# Patient Record
(demographics unavailable — no encounter records)

---

## 2024-12-04 NOTE — ASSESSMENT
[FreeTextEntry1] : Mr. MELENDEZ is a 52 old male with a history of OW, HTN, elevated cholesterol, pre-DM, asthma, COVID-19 3/2022 allergies, low vitamin D, prior MI, lipoprotein A deficiency- asthma, allergies, low testosterone, poor sleep - stable- except fatigue (#1)  Mr. Melendez's SOB is multifactorial due to: -poor mechanics of breathing -out of shape - over weight - pulmonary disease  - mild asthma -cardiac disease / CAD prior MI  problem 1: OSAS, EDS (NC) - s/p home sleep study - repeat HSS -based on his fatigue; EDS, snoring, and elevated mallampati class -recommended "snore tape" -recommended CoQ10 200mg QAM  -Sleep apnea is associated with adverse clinical consequences which an affect most organ systems. Cardiovascular disease risk includes arrhythmias, atrial fibrillation, hypertension, coronary artery disease, and stroke. Metabolic disorders include diabetes type 2, non-alcoholic fatty liver disease. Mood disorder especially depression; and cognitive decline especially in the elderly. Associations with chronic reflux/Magana's esophagus some but not all inclusive. -Reasons to assess this include arousal consistent with hypopnea; respiratory events most prominent in REM sleep or supine position; therefore sleep staging and body position are important for accurate diagnosis and estimation of AHI.  problem 2: mild asthma (Quiet) -continue Ventolin 2 puffs Q6H, pre-exercise -Asthma is believed to be caused by inherited (genetic) and environmental factor, but its exact cause is unknown. Asthma may be triggered by allergens, lung infections, or irritants in the air. Asthma triggers are different for each person -Inhaler technique reviewed as well as oral hygiene techniques reviewed with patient. Avoidance of cold air, extremes of temperature, rescue inhaler should be used before exercise. Order of medication reviewed with patient. Recommended use of a cool mist humidifier in the bedroom.  Problem 3: Cardiac (CAD prior MI) - follow up with   Problem 3A: Fatty liver and RUQ pain -recommended to have evaluation with Dr. Familia Smith  problem 4: low testosterone/high triglycerides -he is being recommended to have an endocrinologist evaluation and he was given the name of Dr. Adolph Brink - recommended [place] 6mg BID - repeat HSS  problem 4: obesity (improved) -recommended berberine OTC for weight loss -Weight loss, exercise, and diet control were discussed and are highly encouraged. Treatment options were given such as, aqua therapy, and contacting a nutritionist. Recommended to use the elliptical, stationary bike, less use of treadmill. Obesity is associated with worsening asthma, shortness of breath, and potential for cardiac disease, diabetes, and other underlying medical conditions.  problem 5: low vitamin D -continue to use supplemental vitamin d at 50,000 units every 2 weeks -Has been associated with asthma exacerbations and increased allergic symptoms. The goal based on recent information is maintaining levels between 50-70 and low normal is 30. Recommended 50,000 units every two weeks to once a month depending on the level.  problem 6: allergies- quiet -continue to use nasal saline PRN -Environmental measures for allergies were encouraged including mattress and pillow cover, air purifier, and environmental controls.  Problem 7: Health Maintenance/COVID19 Precautions: -s/p COVID-19 vaccine x2 - discussed w/ pt about booster -Recommended [employer] -recommended Sanotize anti viral nasal spray in case of viral infection Immune Support Recommendations: -OTC Vitamin C [id number] BID -OTC Quercetin 1000mg BID -OTC Zinc 50-100mg per day -OTC Melatonin 1-3mg a night -OTC Vitamin D 2000mg per day  Problem 8: health maintenance -recommended yearly flu shot 2024 -recommended strep pneumonia vaccines: Prevnar-20 vaccine, followed by Pneumo vaccine 23 one year following after 65 years old. -recommended early intervention for Upper Respiratory Infections (URIs) -recommended regular osteoporosis evaluations -recommended early dermatological evaluations -recommended after the age of 50 to the age of 70, colonoscopy every 5 years   F/P in 3 months He is encouraged to call with any changes, concerns, or questions.

## 2024-12-04 NOTE — REASON FOR VISIT
[Follow-Up] : a follow-up visit [TextBox_44] : allergies, asthma, low vitamin d, EDS / fatigue (Inadequate Sleep)

## 2024-12-04 NOTE — HISTORY OF PRESENT ILLNESS
[FreeTextEntry1] : Mr. Ruvalcaba is a 52 year old male presenting to the office today for a follow-up pulmonary evaluation for allergies, asthma, low vitamin d. His chief complaint is  -he notes feeling generally well  -he notes exercising (walking) -he notes night sweats -he notes bowels are regular  -he notes his senses of smell and taste are stable  -he notes vision is stable  -he notes sleep quality could be improved, but is still improved from prior -he notes sleeping for 6-7 hours, better than before -he notes he wears cotton at night -he denies significant back pain -he denies any muscle cramps or spasms  -he denies bringing up mucus at this time -he notes he had bronchitis in the summer -he notes snoring  -he notes work is keeping him busy -he notes his #1 issue is fatigue -he denies taking supplements beyond a multivitamin  -he denies any headaches, nausea, emesis, fever, chills, chest pain, chest pressure, coughing, wheezing, palpitations, diarrhea, constipation, dysphagia, vertigo, arthralgias, myalgias, leg swelling, itchy eyes, itchy ears, heartburn, reflux, or sour taste in the mouth.

## 2024-12-04 NOTE — ADDENDUM
[FreeTextEntry1] : Documented by Key Lipscomb acting as a scribe for Dr. Dario Nolasco on 12/04/2024. All medical record entries made by the Scribe were at my, Dr. Dario Nolasco's, direction and personally dictated by me on 12/04/2024. I have reviewed the chart and agree that the record accurately reflects my personal performance of the history, physical exam, assessment and plan. I have also personally directed, reviewed, and agree with the discharge instructions.

## 2024-12-04 NOTE — PROCEDURE
[FreeTextEntry1] : PFTs revealed normal flows; FEV1 was 3.44L, which is 88% of predicted; normal flow volume loop. PFTs were performed to evaluate for asthma  FENO was 23; a normal value being less than 25 Fractional exhaled nitric oxide (FENO) is regarded as a simple, noninvasive method for assessing eosinophilic airway inflammation. Produced by a variety of cells within the lung, nitric oxide (NO) concentrations are generally low in healthy individuals. However, high concentrations of NO appear to be involved in nonspecific host defense mechanisms and chronic inflammatory diseases such as asthma. The American Thoracic Society (ATS) therefore has recommended using FENO to aid in the diagnosis and monitoring of eosinophilic airway inflammation and asthma, and for identifying steroid responsive individuals whose chronic respiratory symptoms may be caused by airway inflammation.

## 2025-06-11 NOTE — ADDENDUM
[FreeTextEntry1] : Documented by Key Lipscomb acting as a scribe for Dr. Dario Nolasco on 06/11/2025. All medical record entries made by the Scribe were at my, Dr. Dario Nolasco's, direction and personally dictated by me on 06/11/2025. I have reviewed the chart and agree that the record accurately reflects my personal performance of the history, physical exam, assessment and plan. I have also personally directed, reviewed, and agree with the discharge instructions.

## 2025-06-11 NOTE — PHYSICAL EXAM
[No Acute Distress] : no acute distress [Normal Oropharynx] : normal oropharynx [III] : Mallampati Class: III [Normal Appearance] : normal appearance [No Neck Mass] : no neck mass [Normal Rate/Rhythm] : normal rate/rhythm [Normal S1, S2] : normal s1, s2 [No Murmurs] : no murmurs [No Resp Distress] : no resp distress [Clear to Auscultation Bilaterally] : clear to auscultation bilaterally [Benign] : benign [Normal Gait] : normal gait [No Clubbing] : no clubbing [No Cyanosis] : no cyanosis [No Edema] : no edema [FROM] : FROM [Normal Color/ Pigmentation] : normal color/ pigmentation [No Focal Deficits] : no focal deficits [Oriented x3] : oriented x3 [Normal Affect] : normal affect [TextBox_68] : I:E 1:3; Clear  [TextBox_2] : ow [TextBox_80] : mild kyphosis

## 2025-06-11 NOTE — REASON FOR VISIT
[Follow-Up] : a follow-up visit [Other: _____] : [unfilled] [TextBox_44] : allergies, asthma, low vitamin d, EDS / fatigue (Inadequate Sleep)

## 2025-06-11 NOTE — HISTORY OF PRESENT ILLNESS
[FreeTextEntry1] : Mr. Ruvalcaba is a 53-year-old male presenting to the office today for a follow-up pulmonary evaluation with a history of allergies, asthma, low vitamin d.   -he notes feeling generally well -he denies exercising due to coming home very late from work the last 3 months -he notes he'll be working at [employer] soon -he notes energy levels are 6-7/10 -he notes gaining weight (15 lbs) -he denies chest pain and chest pressure -he notes bowels are regular -he denies dysphagia/sour taste in the mouth -he notes sleep quality and quantity vary -he denies any muscle cramps or spasms  -he notes he tripped and fell at work 1 week ago. He feels fully recovered -he denies any headaches -he denies sinus congestion, rhinitis, or PNDrip  -he denies taking any new medications, vitamins, or supplements -he notes he's on repatha, vitamin D, amlodipine, irbesartan -he notes Dr. Campo told him to stop taking CoQ10 because he doesn't need it

## 2025-06-11 NOTE — PROCEDURE
[FreeTextEntry1] : Full PFT reveals normal flows; FEV1 was 3.30L which is 98% of predicted; normal lung volumes; normal diffusion at 24.71, which is 101% of predicted; normal flow volume loop. PFTs were performed to evaluate for asthma  FENO was 24; a normal value being less than 25 Fractional exhaled nitric oxide (FENO) is regarded as a simple, noninvasive method for assessing eosinophilic airway inflammation. Produced by a variety of cells within the lung, nitric oxide (NO) concentrations are generally low in healthy individuals. However, high concentrations of NO appear to be involved in nonspecific host defense mechanisms and chronic inflammatory diseases such as asthma. The American Thoracic Society (ATS) therefore has recommended using FENO to aid in the diagnosis and monitoring of eosinophilic airway inflammation and asthma, and for identifying steroid responsive individuals whose chronic respiratory symptoms may be caused by airway inflammation.

## 2025-06-11 NOTE — ASSESSMENT
[FreeTextEntry1] : Mr. MELENDEZ is a 53 year old male with a history of OW, HTN, elevated cholesterol, pre-DM, asthma, COVID-19 3/2022 allergies, low vitamin D, prior MI, lipoprotein A deficiency- asthma, allergies, low testosterone, poor sleep - stable- except weight and exercise routine (#1)  Mr. Melendez's SOB is multifactorial due to: -poor mechanics of breathing -out of shape - over weight - pulmonary disease  - mild asthma -cardiac disease / CAD prior MI  problem 1: OSAS, EDS - s/p home sleep study- negative 2017 -based on his fatigue; EDS, snoring, and elevated mallampati class -recommended "snore tape" -continue CoQ10 200mg QAM  -Sleep apnea is associated with adverse clinical consequences which an affect most organ systems. Cardiovascular disease risk includes arrhythmias, atrial fibrillation, hypertension, coronary artery disease, and stroke. Metabolic disorders include diabetes type 2, non-alcoholic fatty liver disease. Mood disorder especially depression; and cognitive decline especially in the elderly. Associations with chronic reflux/Magana's esophagus some but not all inclusive. -Reasons to assess this include arousal consistent with hypopnea; respiratory events most prominent in REM sleep or supine position; therefore sleep staging and body position are important for accurate diagnosis and estimation of AHI.  problem 2: mild asthma (Quiet) -continue Ventolin 2 puffs Q6H, pre-exercise -Asthma is believed to be caused by inherited (genetic) and environmental factor, but its exact cause is unknown. Asthma may be triggered by allergens, lung infections, or irritants in the air. Asthma triggers are different for each person -Inhaler technique reviewed as well as oral hygiene techniques reviewed with patient. Avoidance of cold air, extremes of temperature, rescue inhaler should be used before exercise. Order of medication reviewed with patient. Recommended use of a cool mist humidifier in the bedroom.  Problem 3: Cardiac (CAD prior MI) - follow-up with   Problem 3A: Fatty liver and RUQ pain -recommended to have evaluation with Dr. Familia Smith  problem 4: low testosterone/high triglycerides -he is being recommended to have an endocrinologist evaluation and he was given the name of Dr. Adolph Brink - recommended [place] 6mg BID - repeat HSS -HgbA1C 6.5 2025  problem 4A: obesity (improved) -recommended berberine OTC for weight loss -Weight loss, exercise, and diet control were discussed and are highly encouraged. Treatment options were given such as, aqua therapy, and contacting a nutritionist. Recommended to use the elliptical, stationary bike, less use of treadmill. Obesity is associated with worsening asthma, shortness of breath, and potential for cardiac disease, diabetes, and other underlying medical conditions.  problem 5: low vitamin D -continue to use supplemental vitamin d at 50,000 units every 2 weeks -Has been associated with asthma exacerbations and increased allergic symptoms. The goal based on recent information is maintaining levels between 50-70 and low normal is 30. Recommended 50,000 units every two weeks to once a month depending on the level.  problem 6: allergies- quiet -continue to use nasal saline PRN -Environmental measures for allergies were encouraged including mattress and pillow cover, air purifier, and environmental controls.  Problem 7: Health Maintenance/COVID19 Precautions: -s/p COVID-19 vaccine x2 - discussed w/ pt about booster -Recommended [employer] -recommended Sanotize anti viral nasal spray in case of viral infection Immune Support Recommendations: -OTC Vitamin C [id number] BID -OTC Quercetin 1000mg BID -OTC Zinc 50-100mg per day -OTC Melatonin 1-3mg a night -OTC Vitamin D 2000mg per day  Problem 8: health maintenance -recommended -1,000 mg QAM -s/p yearly flu shot 2024 -recommended strep pneumonia vaccines: Prevnar-21 vaccine after 65 years old. -recommended early intervention for Upper Respiratory Infections (URIs) -recommended regular osteoporosis evaluations -recommended early dermatological evaluations -recommended after the age of 50 to the age of 70, colonoscopy every 5 years   F/P in 3 months He is encouraged to call with any changes, concerns, or questions.